# Patient Record
Sex: MALE | Race: WHITE
[De-identification: names, ages, dates, MRNs, and addresses within clinical notes are randomized per-mention and may not be internally consistent; named-entity substitution may affect disease eponyms.]

---

## 2019-07-12 ENCOUNTER — HOSPITAL ENCOUNTER (EMERGENCY)
Dept: HOSPITAL 25 - UCCORT | Age: 74
Discharge: HOME | End: 2019-07-12
Payer: MEDICARE

## 2019-07-12 VITALS — DIASTOLIC BLOOD PRESSURE: 77 MMHG | SYSTOLIC BLOOD PRESSURE: 129 MMHG

## 2019-07-12 DIAGNOSIS — E78.5: ICD-10-CM

## 2019-07-12 DIAGNOSIS — H10.9: Primary | ICD-10-CM

## 2019-07-12 PROCEDURE — 99202 OFFICE O/P NEW SF 15 MIN: CPT

## 2019-07-12 PROCEDURE — G0463 HOSPITAL OUTPT CLINIC VISIT: HCPCS

## 2019-07-12 NOTE — UC
Eye Complaint HPI





- HPI Summary


HPI Summary: 





Pt presents with c/o sudden onset of right eye redness,and white discharge, 

tenderness X 2 days. 





- History of Current Complaint


Chief Complaint: UCEye


Stated Complaint: BILATERAL EYE CONCERN


Hx Obtained From: Patient


Onset/Duration: Sudden Onset, Lasting Days, Still Present


Timing: Constant


Severity Initially: Mild


Severity Currently: Mild


Pain Intensity: 0


Character: Foreign Body Sensation


Aggravating Factor(s): Blinking


Alleviating Factor(s): Nothing


Associated Signs And Symptoms: Positive: Drainage (Purulent), Swelling





- Risk Factors


Penetrating Injury Risk Factor: Negative


Globe Rupture Risk Factors: Negative


Acute Glaucoma Risk Factors: Negative


Optic Artery Occlusion Risk Factors: Negative





- Allergies/Home Medications


Allergies/Adverse Reactions: 


 Allergies











Allergy/AdvReac Type Severity Reaction Status Date / Time


 


No Known Allergies Allergy   Verified 07/12/19 15:38











Home Medications: 


 Home Medications





Omeprazole 1 tab PO DAILY 07/12/19 [History Confirmed 07/12/19]


Simvastatin [Zocor 5 MG-] 10 mg PO DAILY 07/12/19 [History Confirmed 07/12/19]











PMH/Surg Hx/FS Hx/Imm Hx


Previously Healthy: Yes


Endocrine History: Dyslipidemia


GI/ History: Gastroesophageal Reflux





- Surgical History


Surgical History: Yes


Surgery Procedure, Year, and Place: R hip replacement 2016





- Family History


Known Family History: Positive: Cardiac Disease





- Social History


Occupation: Retired


Lives: With Family


Alcohol Use: None


Substance Use Type: None


Smoking Status (MU): Never Smoked Tobacco


Have You Smoked in the Last Year: No





- Immunization History


Vaccination Up to Date: Yes





Review of Systems


All Other Systems Reviewed And Are Negative: Yes


Constitutional: Positive: Negative


Skin: Positive: Negative


Eyes: Positive: Drainage, Eye Redness


ENT: Positive: Negative


Respiratory: Positive: Negative


Cardiovascular: Positive: Negative


Gastrointestinal: Positive: Negative


Genitourinary: Positive: Negative


Motor: Positive: Negative


Neurovascular: Positive: Negative


Musculoskeletal: Positive: Negative


Neurological: Positive: Negative


Psychological: Positive: Negative


Is Patient Immunocompromised?: No





Physical Exam


Triage Information Reviewed: Yes


Appearance: Well-Appearing


Vital Signs: 


 Initial Vital Signs











Temp  98.2 F   07/12/19 15:31


 


Pulse  62   07/12/19 15:31


 


Resp  18   07/12/19 15:31


 


BP  129/77   07/12/19 15:31


 


Pulse Ox  98   07/12/19 15:31











Vital Signs Reviewed: Yes


Eyes: Positive: Conjunctiva Inflamed - right, Discharge - right


ENT Exam: Normal


Dental Exam: Normal


Neck exam: Normal


Respiratory Exam: Normal


Respiratory: Positive: No respiratory distress


Musculoskeletal Exam: Normal


Neurological Exam: Normal


Psychological Exam: Normal


Skin Exam: Normal





Eye Complaint Course/Dx





- Differential Dx/Diagnosis


Differential Diagnosis/HQI/PQRI: Conjunctivitis, Corneal Abrasion


Provider Diagnosis: 


 Conjunctivitis








Discharge





- Sign-Out/Discharge


Documenting (check all that apply): Patient Departure


All imaging exams completed and their final reports reviewed: No Studies





- Discharge Plan


Condition: Stable


Disposition: HOME


Prescriptions: 


Ofloxacin 0.3% (Eye Drop) [Ocuflox OPTH 0.3% (Eye Drop)] 2 drop BOTH EYES Q6H 7 

Days #1 btl


Patient Education Materials:  Conjunctivitis (ED)


Referrals: 


No Primary Care Phys,NOPCP [Primary Care Provider] - 


[facility] [place] - If Needed





- Billing Disposition and Condition


Condition: STABLE


Disposition: Home